# Patient Record
Sex: MALE | Race: WHITE | NOT HISPANIC OR LATINO | Employment: FULL TIME | URBAN - METROPOLITAN AREA
[De-identification: names, ages, dates, MRNs, and addresses within clinical notes are randomized per-mention and may not be internally consistent; named-entity substitution may affect disease eponyms.]

---

## 2017-08-31 ENCOUNTER — OFFICE VISIT (OUTPATIENT)
Dept: SURGERY | Facility: CLINIC | Age: 27
End: 2017-08-31

## 2017-08-31 VITALS — HEART RATE: 94 BPM | WEIGHT: 165.4 LBS | HEIGHT: 66 IN | OXYGEN SATURATION: 98 % | BODY MASS INDEX: 26.58 KG/M2

## 2017-08-31 DIAGNOSIS — L05.01 PILONIDAL ABSCESS: Primary | ICD-10-CM

## 2017-08-31 PROCEDURE — 87205 SMEAR GRAM STAIN: CPT | Performed by: SURGERY

## 2017-08-31 PROCEDURE — 10080 I&D PILONIDAL CYST SIMPLE: CPT | Performed by: SURGERY

## 2017-08-31 PROCEDURE — 87186 SC STD MICRODIL/AGAR DIL: CPT | Performed by: SURGERY

## 2017-08-31 PROCEDURE — 87070 CULTURE OTHR SPECIMN AEROBIC: CPT | Performed by: SURGERY

## 2017-09-02 NOTE — PROGRESS NOTES
PREOPERATIVE DIAGNOSIS:  Pilonidal abscess    POSTOPERATIVE DIAGNOSIS AND FINDINGS:  Same    PROCEDURE:  Incision and drainage of pilonidal abscess    SURGEON:  Joe Weber MD    ASSISTANT:  None    ANESTHESIA:  1% lidocaine with epinephrine    EBL:  Minimal    SPECIMEN(S):  Culture and sensitivity of pus    DESCRIPTION:  27-year-old gentleman presented with pilonidal abscess.  In prone position prepped and draped usual sterile manner.  1% lidocaine with epinephrine for an infiltrated locally.  Transverse incision was made over the pilonidal abscess which was located at the superior aspect of the intergluteal crease towards the right.  Several cc of purulent fluid and hair were removed from the cavity.  The wound was packed with iodoform gauze.  Wound care instructions given.  Follow-up with me in 1 week.    Joe Weber M.D.

## 2017-09-03 LAB
BACTERIA SPEC AEROBE CULT: ABNORMAL
GRAM STN SPEC: ABNORMAL
GRAM STN SPEC: ABNORMAL

## 2017-09-07 ENCOUNTER — OFFICE VISIT (OUTPATIENT)
Dept: SURGERY | Facility: CLINIC | Age: 27
End: 2017-09-07

## 2017-09-07 DIAGNOSIS — Z09 FOLLOW UP: Primary | ICD-10-CM

## 2017-09-07 PROCEDURE — 99024 POSTOP FOLLOW-UP VISIT: CPT | Performed by: SURGERY

## 2017-09-07 RX ORDER — CEPHALEXIN 500 MG/1
1 CAPSULE ORAL 3 TIMES DAILY
Refills: 0 | COMMUNITY
Start: 2017-08-31 | End: 2019-09-05

## 2017-09-07 NOTE — PROGRESS NOTES
His pilonidal wound has healed completely and he is back to normal.  I discussed with him surgical resection versus continued expectant observation.  He's had minimal trouble so far and we'll keep an eye on things for now.

## 2019-09-05 ENCOUNTER — OFFICE VISIT (OUTPATIENT)
Dept: SURGERY | Facility: CLINIC | Age: 29
End: 2019-09-05

## 2019-09-05 VITALS — BODY MASS INDEX: 26.7 KG/M2 | HEIGHT: 66 IN

## 2019-09-05 DIAGNOSIS — L05.01 PILONIDAL ABSCESS: Primary | ICD-10-CM

## 2019-09-05 PROCEDURE — 87205 SMEAR GRAM STAIN: CPT | Performed by: SURGERY

## 2019-09-05 PROCEDURE — 99213 OFFICE O/P EST LOW 20 MIN: CPT | Performed by: SURGERY

## 2019-09-05 PROCEDURE — 87186 SC STD MICRODIL/AGAR DIL: CPT | Performed by: SURGERY

## 2019-09-05 PROCEDURE — 87077 CULTURE AEROBIC IDENTIFY: CPT | Performed by: SURGERY

## 2019-09-05 PROCEDURE — 87070 CULTURE OTHR SPECIMN AEROBIC: CPT | Performed by: SURGERY

## 2019-09-05 RX ORDER — AMOXICILLIN AND CLAVULANATE POTASSIUM 875; 125 MG/1; MG/1
1 TABLET, FILM COATED ORAL 2 TIMES DAILY
Qty: 28 TABLET | Refills: 0 | Status: SHIPPED | OUTPATIENT
Start: 2019-09-05 | End: 2019-09-26

## 2019-09-07 NOTE — PROGRESS NOTES
SUMMARY (A/P):    29-year-old gentleman with recurrent pilonidal sinus disease and probable spontaneously draining abscess.  I placed him on oral Augmentin for 2 weeks and asked him return at that time.  He is going to try to avoid as much sitting as work and see if these 2 measures are adequate.    CC:    Pilonidal sinus    HPI:    29-year-old gentleman whom I initially saw in September 2017 with a pilonidal abscess for which office incision and drainage was performed.  Indicates that he has done well since that time until he switched to a job that requires more sitting in the last week has had progressively increasing pain in the pilonidal region until the area started draining and the pain decreased.    PSH:    None    PMH:    Unremarkable    FAMILY HISTORY:    Father with colon cancer    SOCIAL HISTORY:   Denies tobacco use  Occasional alcohol use    ALLERGIES: reviewed, in Epic    MEDICATIONS: reviewed, in Epic    ROS:  No chest pain or shortness of air.  Negative for fever or chills    PHYSICAL EXAM:   Constitutional: Well-developed well-nourished, no acute distress  Eyes: Conjunctiva normal, sclera nonicteric  ENMT: Hearing grossly normal, oral mucosa moist  Neck: Supple  Respiratory: Normal inspiratory effort  Cardiovascular: Regular rate  Gastrointestinal: Soft  Skin:  Warm, dry.  In the pilonidal region he has multiple midline sinuses and right and superior to the gluteal crease is an indurated area with somewhat purulent discharge and somewhat tender, no crepitance, no erythema, culture was obtained  Musculoskeletal: Symmetric strength, normal gait  Psychiatric: Alert and oriented ×3, normal affect     TAMANNA DAVIDSON M.D.

## 2019-09-10 LAB
BACTERIA SPEC AEROBE CULT: ABNORMAL
GRAM STN SPEC: ABNORMAL
GRAM STN SPEC: ABNORMAL

## 2019-09-26 ENCOUNTER — OFFICE VISIT (OUTPATIENT)
Dept: SURGERY | Facility: CLINIC | Age: 29
End: 2019-09-26

## 2019-09-26 VITALS — OXYGEN SATURATION: 99 % | HEART RATE: 66 BPM

## 2019-09-26 DIAGNOSIS — L05.01 PILONIDAL CYST WITH ABSCESS: Primary | ICD-10-CM

## 2019-09-26 PROCEDURE — 99212 OFFICE O/P EST SF 10 MIN: CPT | Performed by: SURGERY

## 2019-09-26 NOTE — PROGRESS NOTES
IMPRESSION & PLAN:  Resolved pilonidal abscess with antibiotics. No further treatment warranted at this time.  Follow-up as needed.    CC: Follow-up pilonidal cyst with abscess    HPI: With antibiotics and avoidance of sitting his pilonidal region feels much better.    PE:    Awake, alert  The abscess has completely resolved and he has several pilonidal sinus openings but no evidence of ongoing infection

## 2020-02-28 RX ORDER — AMOXICILLIN AND CLAVULANATE POTASSIUM 875; 125 MG/1; MG/1
TABLET, FILM COATED ORAL
Qty: 28 TABLET | Refills: 0 | OUTPATIENT
Start: 2020-02-28

## 2020-03-03 ENCOUNTER — TELEPHONE (OUTPATIENT)
Dept: SURGERY | Facility: CLINIC | Age: 30
End: 2020-03-03

## 2020-03-03 NOTE — TELEPHONE ENCOUNTER
Called in Augmentin 875-125 mg 1 tab po BID, #20, 1 refill to [facility] on [address]. Informed patient of this as well.

## 2020-03-03 NOTE — TELEPHONE ENCOUNTER
Patient called the office and LVM on clinical line stating he was under the impression from the last time he was seen on 9/26/19 he could just simply request a refill of abx if he had another flare up of his pilonidal cyst. Patient is asking why his refill request for Augmentin from this past Friday 2/28/20 was refused. You were not sent the Rx. Sunil denied it and routed back to the pharmacy. Are you okay with filling this or do you want to see him in the office?

## 2020-04-07 ENCOUNTER — TELEPHONE (OUTPATIENT)
Dept: SURGERY | Facility: CLINIC | Age: 30
End: 2020-04-07

## 2020-04-07 NOTE — TELEPHONE ENCOUNTER
Pt cld stating that his Pilonidal Cyst is flaring up bad again.  He has been taking the antibiotic with no relief. He is on your schedule for 04/30/20, but on the list to resched.    Pt is requesting he be seen - he would prefer sooner than later due to the pain & feels this needs to be drained.    Reschedule him sooner?

## 2020-04-09 ENCOUNTER — TELEPHONE (OUTPATIENT)
Dept: SURGERY | Facility: CLINIC | Age: 30
End: 2020-04-09

## 2020-04-09 ENCOUNTER — OFFICE VISIT (OUTPATIENT)
Dept: SURGERY | Facility: CLINIC | Age: 30
End: 2020-04-09

## 2020-04-09 VITALS — WEIGHT: 163 LBS | HEIGHT: 66 IN | BODY MASS INDEX: 26.2 KG/M2

## 2020-04-09 DIAGNOSIS — L05.01 PILONIDAL CYST WITH ABSCESS: Primary | ICD-10-CM

## 2020-04-09 PROCEDURE — 87077 CULTURE AEROBIC IDENTIFY: CPT | Performed by: SURGERY

## 2020-04-09 PROCEDURE — 87186 SC STD MICRODIL/AGAR DIL: CPT | Performed by: SURGERY

## 2020-04-09 PROCEDURE — 87205 SMEAR GRAM STAIN: CPT | Performed by: SURGERY

## 2020-04-09 PROCEDURE — 87070 CULTURE OTHR SPECIMN AEROBIC: CPT | Performed by: SURGERY

## 2020-04-09 PROCEDURE — 10080 I&D PILONIDAL CYST SIMPLE: CPT | Performed by: SURGERY

## 2020-04-09 RX ORDER — AMOXICILLIN AND CLAVULANATE POTASSIUM 875; 125 MG/1; MG/1
1 TABLET, FILM COATED ORAL 2 TIMES DAILY
COMMUNITY
Start: 2020-03-24 | End: 2020-04-09

## 2020-04-09 NOTE — PROGRESS NOTES
PREOPERATIVE DIAGNOSIS:  Pilonidal abscess    POSTOPERATIVE DIAGNOSIS (FINDINGS):  Same    PROCEDURE:  Incision and drainage of pilonidal abscess    SURGEON:  Joe Weber MD    ANESTHESIA:  1% lidocaine with epinephrine local    EBL:  Minimal    SPECIMEN(S):  Wound culture    DESCRIPTION:  In prone position prepped and draped usual sterile manner.  1% lidocaine with epinephrine infiltrated locally and a 1.5 cm circular incision made around a tender fluctuant indurated abscess located just right of the superior aspect of the intergluteal crease.  Several milliliters of purulent fluid expressed and cultured.  Once I was sure the pus was completely evacuated and good hemostasis was noted, I packed the wound with Betadine soaked gauze.  Wound care instructions given.  Follow-up in several weeks.    Joe Weber M.D.

## 2020-04-09 NOTE — TELEPHONE ENCOUNTER
Patient's Rx did not e-scribe today. Per Dr. Weber, give patient 1 additional week of abx.   Called in the following to [facility] on [address]:  Augmentin 875-125 mg, take 1 tab po BID, #14, NR.

## 2020-04-12 LAB
BACTERIA SPEC AEROBE CULT: ABNORMAL
GRAM STN SPEC: ABNORMAL
GRAM STN SPEC: ABNORMAL

## 2020-07-15 ENCOUNTER — TELEPHONE (OUTPATIENT)
Dept: SURGERY | Facility: CLINIC | Age: 30
End: 2020-07-15

## 2020-07-15 NOTE — TELEPHONE ENCOUNTER
PT is requesting an antibiotic for the swelling he is not able to get into the office until august

## 2020-08-13 ENCOUNTER — OFFICE VISIT (OUTPATIENT)
Dept: SURGERY | Facility: CLINIC | Age: 30
End: 2020-08-13

## 2020-08-13 VITALS — BODY MASS INDEX: 25.33 KG/M2 | WEIGHT: 157.6 LBS | HEIGHT: 66 IN

## 2020-08-13 DIAGNOSIS — L05.01 PILONIDAL CYST WITH ABSCESS: Primary | ICD-10-CM

## 2020-08-13 PROCEDURE — 99213 OFFICE O/P EST LOW 20 MIN: CPT | Performed by: SURGERY

## 2020-08-13 RX ORDER — CEFAZOLIN SODIUM 2 G/100ML
2 INJECTION, SOLUTION INTRAVENOUS ONCE
Status: CANCELLED | OUTPATIENT
Start: 2020-09-04 | End: 2020-08-13

## 2020-08-13 NOTE — PROGRESS NOTES
SUMMARY (A/P):    29-year-old gentleman with chronic recurrent pilonidal cyst disease and abscesses.  He wishes to proceed with elliptical excision of this area as we discussed previously.  He has done extensive research on his own and has seen pictures of the type and size of wounds which can result and is well aware that this can take more than 2 months to heal completely.  He understands that no guarantee can be made that this will prevent further recurrences down the road.  He understands that sometimes additional surgery is necessary to achieve complete closure of the wound.  All questions were answered and he is scheduled accordingly.      CC:    Recurrent pilonidal cyst disease    HPI:    29-year-old gentleman whom I have been seeing since August 2017 for issues with pilonidal cysts.  He is undergone 2 office procedures for drainage of abscesses and now presents with increased pain in the pilonidal worse with direct palpation and associated with drainage.    PSH:    • Incision and drainage of pilonidal abscess 4/9/2020  • Incision and drainage of pilonidal abscess 8/31/2017    PMH:    • Unremarkable    FAMILY HISTORY:    • Father with colon cancer    SOCIAL HISTORY:   • Denies tobacco use  • Occasional alcohol use    ALLERGIES:   • None    MEDICATIONS:   • None    ROS:    Influenza Like Illness: no fever, no  cough, no  sore throat, no  body aches, no loss of sense of taste or smell, no known exposure to person with Covid-19.  All other systems reviewed and negative other than presenting complaints.    PHYSICAL EXAM:   • Constitutional: Well-developed well-nourished, no acute distress  • Vital signs: Weight 157 pounds, height 66 inches, BMI 25.4  • Eyes: Conjunctiva normal, sclera nonicteric  • ENMT: Hearing grossly normal, oral mucosa moist  • Respiratory: Normal inspiratory effort  • Cardiovascular: Regular rate, no jugular venous distention  • Gastrointestinal: Soft  • Skin:  Warm, dry.  In the pilonidal  region he has multiple pilonidal sinuses.  Right superior inter-gluteal crease scarred prior abscess with no active drainage or infection.  Left superior inter-gluteal crease is a tender somewhat indurated region consistent with pilonidal cyst/abscess  • Musculoskeletal: Symmetric strength, normal gait  • Psychiatric: Alert and oriented ×3, normal affect     TAMANNA DAVIDSON M.D.

## 2020-08-25 ENCOUNTER — TRANSCRIBE ORDERS (OUTPATIENT)
Dept: PREADMISSION TESTING | Facility: HOSPITAL | Age: 30
End: 2020-08-25

## 2020-08-25 DIAGNOSIS — Z01.818 OTHER SPECIFIED PRE-OPERATIVE EXAMINATION: Primary | ICD-10-CM

## 2020-08-28 ENCOUNTER — APPOINTMENT (OUTPATIENT)
Dept: PREADMISSION TESTING | Facility: HOSPITAL | Age: 30
End: 2020-08-28

## 2020-08-28 VITALS
TEMPERATURE: 97.6 F | HEIGHT: 66 IN | BODY MASS INDEX: 25.23 KG/M2 | OXYGEN SATURATION: 100 % | SYSTOLIC BLOOD PRESSURE: 134 MMHG | WEIGHT: 157 LBS | DIASTOLIC BLOOD PRESSURE: 85 MMHG | HEART RATE: 62 BPM | RESPIRATION RATE: 16 BRPM

## 2020-08-28 LAB
DEPRECATED RDW RBC AUTO: 43.9 FL (ref 37–54)
ERYTHROCYTE [DISTWIDTH] IN BLOOD BY AUTOMATED COUNT: 13.9 % (ref 12.3–15.4)
HCT VFR BLD AUTO: 47.9 % (ref 37.5–51)
HGB BLD-MCNC: 16.3 G/DL (ref 13–17.7)
MCH RBC QN AUTO: 29.6 PG (ref 26.6–33)
MCHC RBC AUTO-ENTMCNC: 34 G/DL (ref 31.5–35.7)
MCV RBC AUTO: 87.1 FL (ref 79–97)
PLATELET # BLD AUTO: 246 10*3/MM3 (ref 140–450)
PMV BLD AUTO: 11.8 FL (ref 6–12)
RBC # BLD AUTO: 5.5 10*6/MM3 (ref 4.14–5.8)
WBC # BLD AUTO: 6.44 10*3/MM3 (ref 3.4–10.8)

## 2020-08-28 PROCEDURE — 85027 COMPLETE CBC AUTOMATED: CPT | Performed by: SURGERY

## 2020-08-28 PROCEDURE — 36415 COLL VENOUS BLD VENIPUNCTURE: CPT

## 2020-09-02 ENCOUNTER — LAB (OUTPATIENT)
Dept: LAB | Facility: HOSPITAL | Age: 30
End: 2020-09-02

## 2020-09-02 DIAGNOSIS — Z01.818 OTHER SPECIFIED PRE-OPERATIVE EXAMINATION: ICD-10-CM

## 2020-09-02 PROCEDURE — U0004 COV-19 TEST NON-CDC HGH THRU: HCPCS

## 2020-09-02 PROCEDURE — C9803 HOPD COVID-19 SPEC COLLECT: HCPCS

## 2020-09-03 LAB — SARS-COV-2 RNA RESP QL NAA+PROBE: NOT DETECTED

## 2020-09-04 ENCOUNTER — ANESTHESIA EVENT (OUTPATIENT)
Dept: PERIOP | Facility: HOSPITAL | Age: 30
End: 2020-09-04

## 2020-09-04 ENCOUNTER — HOSPITAL ENCOUNTER (OUTPATIENT)
Facility: HOSPITAL | Age: 30
Setting detail: HOSPITAL OUTPATIENT SURGERY
Discharge: HOME | End: 2020-09-04
Attending: SURGERY | Admitting: SURGERY

## 2020-09-04 ENCOUNTER — TELEPHONE (OUTPATIENT)
Dept: SURGERY | Facility: CLINIC | Age: 30
End: 2020-09-04

## 2020-09-04 ENCOUNTER — ANESTHESIA (OUTPATIENT)
Dept: PERIOP | Facility: HOSPITAL | Age: 30
End: 2020-09-04

## 2020-09-04 VITALS
RESPIRATION RATE: 16 BRPM | DIASTOLIC BLOOD PRESSURE: 87 MMHG | HEART RATE: 74 BPM | SYSTOLIC BLOOD PRESSURE: 123 MMHG | OXYGEN SATURATION: 99 % | TEMPERATURE: 97.8 F

## 2020-09-04 DIAGNOSIS — L05.01 PILONIDAL CYST WITH ABSCESS: ICD-10-CM

## 2020-09-04 PROCEDURE — 11772 EXC PILONIDAL CYST COMP: CPT | Performed by: SURGERY

## 2020-09-04 PROCEDURE — 25010000002 MIDAZOLAM PER 1 MG: Performed by: ANESTHESIOLOGY

## 2020-09-04 PROCEDURE — 25010000002 NEOSTIGMINE PER 0.5 MG: Performed by: NURSE ANESTHETIST, CERTIFIED REGISTERED

## 2020-09-04 PROCEDURE — C9290 INJ, BUPIVACAINE LIPOSOME: HCPCS | Performed by: SURGERY

## 2020-09-04 PROCEDURE — 87186 SC STD MICRODIL/AGAR DIL: CPT | Performed by: SURGERY

## 2020-09-04 PROCEDURE — 87070 CULTURE OTHR SPECIMN AEROBIC: CPT | Performed by: SURGERY

## 2020-09-04 PROCEDURE — 25010000003 CEFAZOLIN IN DEXTROSE 2-4 GM/100ML-% SOLUTION: Performed by: SURGERY

## 2020-09-04 PROCEDURE — 25010000003 BUPIVACAINE LIPOSOME 1.3 % SUSPENSION: Performed by: SURGERY

## 2020-09-04 PROCEDURE — 25010000002 FENTANYL CITRATE (PF) 100 MCG/2ML SOLUTION: Performed by: ANESTHESIOLOGY

## 2020-09-04 PROCEDURE — 88304 TISSUE EXAM BY PATHOLOGIST: CPT | Performed by: SURGERY

## 2020-09-04 PROCEDURE — 25010000002 HYDROMORPHONE PER 4 MG: Performed by: NURSE ANESTHETIST, CERTIFIED REGISTERED

## 2020-09-04 PROCEDURE — 87205 SMEAR GRAM STAIN: CPT | Performed by: SURGERY

## 2020-09-04 PROCEDURE — 25010000002 KETOROLAC TROMETHAMINE PER 15 MG: Performed by: NURSE ANESTHETIST, CERTIFIED REGISTERED

## 2020-09-04 PROCEDURE — 25010000002 DEXAMETHASONE PER 1 MG: Performed by: NURSE ANESTHETIST, CERTIFIED REGISTERED

## 2020-09-04 PROCEDURE — 25010000002 SUCCINYLCHOLINE PER 20 MG: Performed by: NURSE ANESTHETIST, CERTIFIED REGISTERED

## 2020-09-04 PROCEDURE — 25010000002 PROPOFOL 10 MG/ML EMULSION: Performed by: NURSE ANESTHETIST, CERTIFIED REGISTERED

## 2020-09-04 RX ORDER — FLUMAZENIL 0.1 MG/ML
0.2 INJECTION INTRAVENOUS AS NEEDED
Status: DISCONTINUED | OUTPATIENT
Start: 2020-09-04 | End: 2020-09-04 | Stop reason: HOSPADM

## 2020-09-04 RX ORDER — SULFAMETHOXAZOLE AND TRIMETHOPRIM 800; 160 MG/1; MG/1
1 TABLET ORAL 2 TIMES DAILY
Qty: 20 TABLET | Refills: 0 | Status: SHIPPED | OUTPATIENT
Start: 2020-09-04 | End: 2020-09-14

## 2020-09-04 RX ORDER — ONDANSETRON 2 MG/ML
4 INJECTION INTRAMUSCULAR; INTRAVENOUS ONCE AS NEEDED
Status: DISCONTINUED | OUTPATIENT
Start: 2020-09-04 | End: 2020-09-04 | Stop reason: HOSPADM

## 2020-09-04 RX ORDER — MIDAZOLAM HYDROCHLORIDE 1 MG/ML
2 INJECTION INTRAMUSCULAR; INTRAVENOUS
Status: DISCONTINUED | OUTPATIENT
Start: 2020-09-04 | End: 2020-09-04 | Stop reason: HOSPADM

## 2020-09-04 RX ORDER — SUCCINYLCHOLINE CHLORIDE 20 MG/ML
INJECTION INTRAMUSCULAR; INTRAVENOUS AS NEEDED
Status: DISCONTINUED | OUTPATIENT
Start: 2020-09-04 | End: 2020-09-04 | Stop reason: SURG

## 2020-09-04 RX ORDER — CEFAZOLIN SODIUM 2 G/100ML
2 INJECTION, SOLUTION INTRAVENOUS ONCE
Status: COMPLETED | OUTPATIENT
Start: 2020-09-04 | End: 2020-09-04

## 2020-09-04 RX ORDER — HYDROCODONE BITARTRATE AND ACETAMINOPHEN 5; 325 MG/1; MG/1
TABLET ORAL
Qty: 50 TABLET | Refills: 0 | Status: SHIPPED | OUTPATIENT
Start: 2020-09-04 | End: 2020-09-24

## 2020-09-04 RX ORDER — GLYCOPYRROLATE 0.2 MG/ML
INJECTION INTRAMUSCULAR; INTRAVENOUS AS NEEDED
Status: DISCONTINUED | OUTPATIENT
Start: 2020-09-04 | End: 2020-09-04 | Stop reason: SURG

## 2020-09-04 RX ORDER — SODIUM CHLORIDE 0.9 % (FLUSH) 0.9 %
3-10 SYRINGE (ML) INJECTION AS NEEDED
Status: DISCONTINUED | OUTPATIENT
Start: 2020-09-04 | End: 2020-09-04 | Stop reason: HOSPADM

## 2020-09-04 RX ORDER — MAGNESIUM HYDROXIDE 1200 MG/15ML
LIQUID ORAL
Qty: 1000 ML | Refills: 0 | Status: SHIPPED | OUTPATIENT
Start: 2020-09-04 | End: 2020-11-12

## 2020-09-04 RX ORDER — FENTANYL CITRATE 50 UG/ML
50 INJECTION, SOLUTION INTRAMUSCULAR; INTRAVENOUS
Status: DISCONTINUED | OUTPATIENT
Start: 2020-09-04 | End: 2020-09-04 | Stop reason: HOSPADM

## 2020-09-04 RX ORDER — FAMOTIDINE 10 MG/ML
20 INJECTION, SOLUTION INTRAVENOUS ONCE
Status: COMPLETED | OUTPATIENT
Start: 2020-09-04 | End: 2020-09-04

## 2020-09-04 RX ORDER — MAGNESIUM HYDROXIDE 1200 MG/15ML
LIQUID ORAL AS NEEDED
Status: DISCONTINUED | OUTPATIENT
Start: 2020-09-04 | End: 2020-09-04 | Stop reason: HOSPADM

## 2020-09-04 RX ORDER — HYDROCODONE BITARTRATE AND ACETAMINOPHEN 7.5; 325 MG/1; MG/1
1 TABLET ORAL ONCE AS NEEDED
Status: DISCONTINUED | OUTPATIENT
Start: 2020-09-04 | End: 2020-09-04 | Stop reason: HOSPADM

## 2020-09-04 RX ORDER — DEXAMETHASONE SODIUM PHOSPHATE 10 MG/ML
INJECTION INTRAMUSCULAR; INTRAVENOUS AS NEEDED
Status: DISCONTINUED | OUTPATIENT
Start: 2020-09-04 | End: 2020-09-04 | Stop reason: SURG

## 2020-09-04 RX ORDER — OXYCODONE AND ACETAMINOPHEN 7.5; 325 MG/1; MG/1
1 TABLET ORAL ONCE AS NEEDED
Status: DISCONTINUED | OUTPATIENT
Start: 2020-09-04 | End: 2020-09-04 | Stop reason: HOSPADM

## 2020-09-04 RX ORDER — ONDANSETRON 4 MG/1
4 TABLET, FILM COATED ORAL EVERY 6 HOURS PRN
Qty: 10 TABLET | Refills: 1 | Status: SHIPPED | OUTPATIENT
Start: 2020-09-04 | End: 2020-09-24

## 2020-09-04 RX ORDER — HYDROMORPHONE HCL 110MG/55ML
PATIENT CONTROLLED ANALGESIA SYRINGE INTRAVENOUS AS NEEDED
Status: DISCONTINUED | OUTPATIENT
Start: 2020-09-04 | End: 2020-09-04 | Stop reason: SURG

## 2020-09-04 RX ORDER — KETOROLAC TROMETHAMINE 30 MG/ML
INJECTION, SOLUTION INTRAMUSCULAR; INTRAVENOUS AS NEEDED
Status: DISCONTINUED | OUTPATIENT
Start: 2020-09-04 | End: 2020-09-04 | Stop reason: SURG

## 2020-09-04 RX ORDER — EPHEDRINE SULFATE 50 MG/ML
5 INJECTION, SOLUTION INTRAVENOUS ONCE AS NEEDED
Status: DISCONTINUED | OUTPATIENT
Start: 2020-09-04 | End: 2020-09-04 | Stop reason: HOSPADM

## 2020-09-04 RX ORDER — ROCURONIUM BROMIDE 10 MG/ML
INJECTION, SOLUTION INTRAVENOUS AS NEEDED
Status: DISCONTINUED | OUTPATIENT
Start: 2020-09-04 | End: 2020-09-04 | Stop reason: SURG

## 2020-09-04 RX ORDER — LIDOCAINE HYDROCHLORIDE 10 MG/ML
0.5 INJECTION, SOLUTION EPIDURAL; INFILTRATION; INTRACAUDAL; PERINEURAL ONCE AS NEEDED
Status: COMPLETED | OUTPATIENT
Start: 2020-09-04 | End: 2020-09-04

## 2020-09-04 RX ORDER — SODIUM CHLORIDE, SODIUM LACTATE, POTASSIUM CHLORIDE, CALCIUM CHLORIDE 600; 310; 30; 20 MG/100ML; MG/100ML; MG/100ML; MG/100ML
9 INJECTION, SOLUTION INTRAVENOUS CONTINUOUS
Status: DISCONTINUED | OUTPATIENT
Start: 2020-09-04 | End: 2020-09-04 | Stop reason: HOSPADM

## 2020-09-04 RX ORDER — LIDOCAINE HYDROCHLORIDE 20 MG/ML
INJECTION, SOLUTION INFILTRATION; PERINEURAL AS NEEDED
Status: DISCONTINUED | OUTPATIENT
Start: 2020-09-04 | End: 2020-09-04 | Stop reason: SURG

## 2020-09-04 RX ORDER — HYDROMORPHONE HYDROCHLORIDE 1 MG/ML
0.5 INJECTION, SOLUTION INTRAMUSCULAR; INTRAVENOUS; SUBCUTANEOUS
Status: DISCONTINUED | OUTPATIENT
Start: 2020-09-04 | End: 2020-09-04 | Stop reason: HOSPADM

## 2020-09-04 RX ORDER — FENTANYL CITRATE 50 UG/ML
100 INJECTION, SOLUTION INTRAMUSCULAR; INTRAVENOUS
Status: DISCONTINUED | OUTPATIENT
Start: 2020-09-04 | End: 2020-09-04 | Stop reason: HOSPADM

## 2020-09-04 RX ORDER — SODIUM CHLORIDE 0.9 % (FLUSH) 0.9 %
3 SYRINGE (ML) INJECTION EVERY 12 HOURS SCHEDULED
Status: DISCONTINUED | OUTPATIENT
Start: 2020-09-04 | End: 2020-09-04 | Stop reason: HOSPADM

## 2020-09-04 RX ORDER — PROPOFOL 10 MG/ML
VIAL (ML) INTRAVENOUS AS NEEDED
Status: DISCONTINUED | OUTPATIENT
Start: 2020-09-04 | End: 2020-09-04 | Stop reason: SURG

## 2020-09-04 RX ORDER — DEXMEDETOMIDINE HYDROCHLORIDE 100 UG/ML
INJECTION, SOLUTION INTRAVENOUS AS NEEDED
Status: DISCONTINUED | OUTPATIENT
Start: 2020-09-04 | End: 2020-09-04 | Stop reason: SURG

## 2020-09-04 RX ORDER — BUPIVACAINE HYDROCHLORIDE AND EPINEPHRINE 5; 5 MG/ML; UG/ML
INJECTION, SOLUTION EPIDURAL; INTRACAUDAL; PERINEURAL AS NEEDED
Status: DISCONTINUED | OUTPATIENT
Start: 2020-09-04 | End: 2020-09-04 | Stop reason: HOSPADM

## 2020-09-04 RX ORDER — HYDRALAZINE HYDROCHLORIDE 20 MG/ML
5 INJECTION INTRAMUSCULAR; INTRAVENOUS
Status: DISCONTINUED | OUTPATIENT
Start: 2020-09-04 | End: 2020-09-04 | Stop reason: HOSPADM

## 2020-09-04 RX ADMIN — PROPOFOL 200 MG: 10 INJECTION, EMULSION INTRAVENOUS at 07:18

## 2020-09-04 RX ADMIN — SUCCINYLCHOLINE CHLORIDE 80 MG: 20 INJECTION, SOLUTION INTRAMUSCULAR; INTRAVENOUS at 07:18

## 2020-09-04 RX ADMIN — LIDOCAINE HYDROCHLORIDE 0.5 ML: 10 INJECTION, SOLUTION EPIDURAL; INFILTRATION; INTRACAUDAL; PERINEURAL at 06:12

## 2020-09-04 RX ADMIN — ROCURONIUM BROMIDE 10 MG: 10 INJECTION, SOLUTION INTRAVENOUS at 07:32

## 2020-09-04 RX ADMIN — SODIUM CHLORIDE, POTASSIUM CHLORIDE, SODIUM LACTATE AND CALCIUM CHLORIDE 9 ML/HR: 600; 310; 30; 20 INJECTION, SOLUTION INTRAVENOUS at 06:11

## 2020-09-04 RX ADMIN — MIDAZOLAM 2 MG: 1 INJECTION INTRAMUSCULAR; INTRAVENOUS at 06:11

## 2020-09-04 RX ADMIN — FAMOTIDINE 20 MG: 10 INJECTION INTRAVENOUS at 06:12

## 2020-09-04 RX ADMIN — CEFAZOLIN SODIUM 2 G: 2 INJECTION, SOLUTION INTRAVENOUS at 07:10

## 2020-09-04 RX ADMIN — DEXMEDETOMIDINE 10 MCG: 100 INJECTION, SOLUTION, CONCENTRATE INTRAVENOUS at 07:36

## 2020-09-04 RX ADMIN — FENTANYL CITRATE 50 MCG: 50 INJECTION INTRAMUSCULAR; INTRAVENOUS at 07:18

## 2020-09-04 RX ADMIN — KETOROLAC TROMETHAMINE 30 MG: 30 INJECTION, SOLUTION INTRAMUSCULAR; INTRAVENOUS at 07:49

## 2020-09-04 RX ADMIN — LIDOCAINE HYDROCHLORIDE 100 MG: 20 INJECTION, SOLUTION INFILTRATION; PERINEURAL at 07:18

## 2020-09-04 RX ADMIN — Medication 2 MG: at 08:01

## 2020-09-04 RX ADMIN — CEFAZOLIN SODIUM 2 G: 2 INJECTION, SOLUTION INTRAVENOUS at 07:26

## 2020-09-04 RX ADMIN — HYDROMORPHONE HYDROCHLORIDE 0.5 MG: 2 INJECTION, SOLUTION INTRAMUSCULAR; INTRAVENOUS; SUBCUTANEOUS at 07:49

## 2020-09-04 RX ADMIN — DEXAMETHASONE SODIUM PHOSPHATE 8 MG: 10 INJECTION INTRAMUSCULAR; INTRAVENOUS at 07:26

## 2020-09-04 RX ADMIN — FENTANYL CITRATE 50 MCG: 50 INJECTION INTRAMUSCULAR; INTRAVENOUS at 07:30

## 2020-09-04 RX ADMIN — GLYCOPYRROLATE 0.1 MG: 0.2 INJECTION INTRAMUSCULAR; INTRAVENOUS at 08:01

## 2020-09-04 RX ADMIN — ROCURONIUM BROMIDE 10 MG: 10 INJECTION, SOLUTION INTRAVENOUS at 07:18

## 2020-09-04 NOTE — TELEPHONE ENCOUNTER
Pt's significant other called for wound care instructions s/p pilonidal cystectomy today. The discharge instructions they received were post-op instructions for a hemorrhoidectomy and did not include information regarding wound care. Wet-to-dry dressings? BID? Please advise.

## 2020-09-04 NOTE — ANESTHESIA PROCEDURE NOTES
Airway  Urgency: elective    Date/Time: 9/4/2020 7:19 AM  Airway not difficult    General Information and Staff    Patient location during procedure: OR  CRNA: Roberto Kasper CRNA    Indications and Patient Condition  Indications for airway management: airway protection    Preoxygenated: yes  MILS maintained throughout  Mask difficulty assessment: 1 - vent by mask    Final Airway Details  Final airway type: endotracheal airway      Successful airway: ETT  Cuffed: yes   Successful intubation technique: direct laryngoscopy  Endotracheal tube insertion site: oral  Blade: Luke  Blade size: 3  ETT size (mm): 7.5  Cormack-Lehane Classification: grade I - full view of glottis  Placement verified by: chest auscultation   Measured from: teeth  ETT/EBT  to teeth (cm): 22  Number of attempts at approach: 1  Assessment: lips, teeth, and gum same as pre-op and atraumatic intubation

## 2020-09-04 NOTE — ANESTHESIA PREPROCEDURE EVALUATION
Anesthesia Evaluation     Patient summary reviewed and Nursing notes reviewed   NPO Solid Status: > 8 hours             Airway   Mallampati: II  TM distance: >3 FB  Neck ROM: full  no difficulty expected  Dental - normal exam     Pulmonary - normal exam   (+) a smoker Current,   Cardiovascular - negative cardio ROS and normal exam        Neuro/Psych- negative ROS  GI/Hepatic/Renal/Endo - negative ROS     Musculoskeletal (-) negative ROS    Abdominal  - normal exam   Substance History - negative use     OB/GYN negative ob/gyn ROS         Other                        Anesthesia Plan    ASA 2     general     intravenous induction     Anesthetic plan, all risks, benefits, and alternatives have been provided, discussed and informed consent has been obtained with: patient.    Plan discussed with CRNA.

## 2020-09-04 NOTE — OP NOTE
PREOPERATIVE DIAGNOSIS:  Extensive pilonidal cyst disease and abscess    POSTOPERATIVE DIAGNOSIS (FINDINGS):  Same    PROCEDURE:  Complex excision of pilonidal cyst disease    SURGEON:  Joe Weber MD    ASSISTANT:  Linda Sales    ANESTHESIA:  General    EBL:  Minimal    SPECIMEN(S):  Pilonidal cyst    DESCRIPTION:  In prone jackknife position under general anesthetic prepped and draped usual sterile manner.  Elliptical incision made around the pilonidal sinus openings in the midline and carried to the pre-sacral fascia and the sinuses and underlying cyst were completely excised.  At the superior left and right lateral aspect of the superior extent of pilonidal sinus disease, transverse incision was made over each of the cutaneous abscesses.  These extended to the midline wound and were debrided with uterine curettes.  Good hemostasis was achieved with electrocautery.  Half percent Marcaine with epinephrine mixed with Exparel was infiltrated locally.  Wound was packed with Betadine soaked gauze.  Tolerated well.    Joe Weber M.D.

## 2020-09-04 NOTE — DISCHARGE INSTRUCTIONS
Dr. Joe Weber  [address]  [place] [id number] [phone]    Discharge Instructions for Hemorrhoidectomy/Anal Fissures    1. Go home, rest and take it easy today; however, you should get up and move about several times today to reduce the risk of developing a clot in your legs.      2. You may experience some dizziness or memory loss from the anesthesia.  This may last for the next 24 hours.  Someone should plan on staying with you for the first 24 hours for your safety.    3. Do not make any important legal decisions or sign any legal papers for the next 24 hours.      4. Eat and drink lightly today.  Start off with liquids, jello, soup, crackers or other bland foods at first. Please drink plenty of fluids. You may advance your diet tomorrow as tolerated as long as you do not experience any nausea or vomiting.     5. You should use ice to the anal area today and begin your sitz baths tomorrow.    6. The best method of pain relief is a sitz bath (sitting in a tub or warm water) at least 3 times daily for 15 minutes.  This helps to reduce pain and aids with hygiene/drainage.  The drainage may have an unpleasant odor.  This is not unexpected and should be controlled with baths and showers.  If the skin around the anal area becomes irritated, you may apply Vaseline, A&D ointment or a similar barrier cream to the area.       7. Bleeding and drainage are to be expected and may persist for as long as 2-4 weeks. Bleeding may occur with your bowel movements as well. Wear a cotton liner such as a Kotex pad or panty liner inside your underwear to protect your clothing.      8. Use some type of flushable, moistened wipe instead of bathroom tissue to keep the anal area clean after your bowel movements. It may be necessary to cleanse yourself in the bathtub or shower following your bowel movements.    9. You have received a prescription for a narcotic pain medicine, as you will have pain following  surgery.   You will not be totally pain free, but your pain medicine should make the pain tolerable.  Please take your pain medicine as prescribed and always take your pills with food to prevent nausea. Your pain may persist for 1-3 weeks. If you are having severe pain that cannot be controlled by the pain medicine, please contact me.  Typically, patients with anal fissures will have less pain than those with hemorrhoids.      10. The goal is for your bowel movements to be soft which will help to minimize pain.  The pain medicine used to keep your comfortable may also cause some constipation so I recommend the following:    • Colace stool softener 100mg by mouth 2 times daily  • Citrucel powder 1 tablespoon in 8oz of water daily  • Miralax daily if Colace and Citrucel do not results in soft bowel movements.      Contact me if the above does not result in soft bowel movements as you may need to add to the regimen.      11. You have also received a prescription for an anti-nausea medicine.  Please take this as prescribed for any nausea or vomiting.  Nausea could be a result of the anesthesia or a result of the narcotic pain medicine.  If you experience severe nausea and vomiting that cannot be controlled by the nausea medicine, please call me.      12. No driving for 24 hours and for as long as you are taking your prescription pain medicine.  You may resume your activities gradually.       13. You will need to call the office at [phone] to schedule a follow-up appointment in 6-10 days.    14. Remember to contact me for any of the following:    • Fever > 101 degrees  • Severe pain that cannot be controlled by taking your pain pills  • Severe nausea or vomiting that cannot be controlled by taking your nausea pills  • Significant bleeding   • Any other questions or concerns

## 2020-09-04 NOTE — ANESTHESIA POSTPROCEDURE EVALUATION
Patient: Mj Olsen    Procedure Summary     Date:  09/04/20 Room / Location:   [facility]  [facility]    Anesthesia Start:  0714 Anesthesia Stop:  0810    Procedure:  PILONIDAL CYSTECTOMY (N/A Sacrum) Diagnosis:       Pilonidal cyst with abscess      (Pilonidal cyst with abscess [L05.01])    Surgeon:  oJe Weber MD Provider:  Antoni Alvarez MD    Anesthesia Type:  general ASA Status:  2          Anesthesia Type: general    Vitals  Vitals Value Taken Time   /88 9/4/2020  8:30 AM   Temp 36.6 °C (97.8 °F) 9/4/2020  8:30 AM   Pulse 75 9/4/2020  8:34 AM   Resp 16 9/4/2020  8:30 AM   SpO2 96 % 9/4/2020  8:35 AM   Vitals shown include unvalidated device data.        Post Anesthesia Care and Evaluation    Patient location during evaluation: bedside  Patient participation: complete - patient participated  Level of consciousness: awake  Pain score: 1  Pain management: adequate  Airway patency: patent  Anesthetic complications: No anesthetic complications  PONV Status: controlled  Cardiovascular status: acceptable  Respiratory status: acceptable  Hydration status: acceptable    Comments: --------------------            09/04/20               0847     --------------------   BP:       123/87     Pulse:      74       Resp:       16       Temp:                SpO2:      99%      --------------------

## 2020-09-04 NOTE — TELEPHONE ENCOUNTER
Tell them sorry.  I specifically put instructions in epic which apparently were not shared.  All they need to do is remove the packing that was placed at surgery tomorrow and start wet-to-dry normal saline dressing changes twice a day.  They were supposed to get some 4 x 4 gauze and a liter of normal saline and I suspect that did not happen either.  If that is the case, please call in prescription for normal saline and 4 x 4 gauze.  He may shower over the open wound anytime starting tomorrow.  Thank you.

## 2020-09-08 LAB
LAB AP CASE REPORT: NORMAL
PATH REPORT.FINAL DX SPEC: NORMAL
PATH REPORT.GROSS SPEC: NORMAL

## 2020-09-09 LAB
BACTERIA SPEC AEROBE CULT: ABNORMAL
GRAM STN SPEC: ABNORMAL
GRAM STN SPEC: ABNORMAL

## 2020-09-10 ENCOUNTER — OFFICE VISIT (OUTPATIENT)
Dept: SURGERY | Facility: CLINIC | Age: 30
End: 2020-09-10

## 2020-09-10 DIAGNOSIS — Z09 FOLLOW UP: Primary | ICD-10-CM

## 2020-09-10 PROCEDURE — 99024 POSTOP FOLLOW-UP VISIT: CPT | Performed by: SURGERY

## 2020-09-10 NOTE — PROGRESS NOTES
Complex excision pilonidal cyst disease 9/4/2020    Office visit: Wound is in good order with no evidence of infection.  Instructed wet-to-dry dressing changes twice daily.  Return to office in 2 weeks.

## 2020-09-24 ENCOUNTER — OFFICE VISIT (OUTPATIENT)
Dept: SURGERY | Facility: CLINIC | Age: 30
End: 2020-09-24

## 2020-09-24 DIAGNOSIS — Z09 FOLLOW UP: Primary | ICD-10-CM

## 2020-09-24 PROCEDURE — 99024 POSTOP FOLLOW-UP VISIT: CPT | Performed by: SURGERY

## 2020-09-24 NOTE — PROGRESS NOTES
Wounds over former abscesses are healing well.  Main midline wound is also granulating nicely and magalie.  Treated with silver nitrate.  Return in 2 weeks.

## 2020-10-05 ENCOUNTER — TELEPHONE (OUTPATIENT)
Dept: SURGERY | Facility: CLINIC | Age: 30
End: 2020-10-05

## 2020-10-05 NOTE — TELEPHONE ENCOUNTER
Patient called requesting saline refill for wet-to-dry dressing changes. Called new prescription into pharmacy, left message informing pt.

## 2020-10-08 ENCOUNTER — OFFICE VISIT (OUTPATIENT)
Dept: SURGERY | Facility: CLINIC | Age: 30
End: 2020-10-08

## 2020-10-08 DIAGNOSIS — Z09 FOLLOW UP: Primary | ICD-10-CM

## 2020-10-08 PROCEDURE — 99024 POSTOP FOLLOW-UP VISIT: CPT | Performed by: SURGERY

## 2020-10-10 NOTE — PROGRESS NOTES
Wound is magalie nicely and granulating well at the base.  Treated with silver nitrate.  Return in 3 weeks.

## 2020-10-19 ENCOUNTER — OFFICE VISIT (OUTPATIENT)
Dept: SURGERY | Facility: CLINIC | Age: 30
End: 2020-10-19

## 2020-10-19 DIAGNOSIS — Z09 FOLLOW UP: Primary | ICD-10-CM

## 2020-10-19 PROCEDURE — 99024 POSTOP FOLLOW-UP VISIT: CPT | Performed by: SURGERY

## 2020-10-20 NOTE — PROGRESS NOTES
Wound is almost healed completely.  A tiny area was treated with silver nitrate.  Follow-up in 3 to 4 weeks.

## 2020-11-12 ENCOUNTER — OFFICE VISIT (OUTPATIENT)
Dept: SURGERY | Facility: CLINIC | Age: 30
End: 2020-11-12

## 2020-11-12 DIAGNOSIS — Z09 FOLLOW UP: Primary | ICD-10-CM

## 2020-11-12 PROCEDURE — 99024 POSTOP FOLLOW-UP VISIT: CPT | Performed by: SURGERY

## 2020-11-13 NOTE — PROGRESS NOTES
Everything has healed completely.  Recommended depilatory agent to pilonidal region on a regular basis.  Follow-up as needed.

## 2021-04-16 ENCOUNTER — BULK ORDERING (OUTPATIENT)
Dept: CASE MANAGEMENT | Facility: OTHER | Age: 31
End: 2021-04-16

## 2021-04-16 DIAGNOSIS — Z23 IMMUNIZATION DUE: ICD-10-CM

## 2022-02-07 ENCOUNTER — TELEPHONE (OUTPATIENT)
Dept: SURGERY | Facility: CLINIC | Age: 32
End: 2022-02-07

## 2022-02-07 RX ORDER — SULFAMETHOXAZOLE AND TRIMETHOPRIM 800; 160 MG/1; MG/1
1 TABLET ORAL 2 TIMES DAILY
Qty: 20 TABLET | Refills: 0 | Status: SHIPPED | OUTPATIENT
Start: 2022-02-07 | End: 2022-02-17

## 2022-02-07 NOTE — TELEPHONE ENCOUNTER
I have sent a prescription for Bactrim DS to his [facility] pharmacy for 10 days of treatment. Could someone please call the patient back and let him know the antibiotics have been called in?    Thanks,  VIKY

## 2022-02-07 NOTE — TELEPHONE ENCOUNTER
Caller: Mj Olsen    Relationship: Self    Best call back number: [phone]    What is the best time to reach you: ANYTIME    What was the call regarding: PATIENT'S PILONIDAL CYST HAS COME BACK AND HAS A FOLLOW UP APPT SCHEDULED FOR 02/21/22 WITH DR. DAVIDSON. WANTS TO KNOW IF HE CAN BE PRESCRIBED AN ANTIBIOTIC TO HELP WITH THE SWELLING IN THE MEAN TIME.     PHARMACY:  [facility]   [address] [place] [address]  PH: [phone]    Do you require a callback: YES

## 2022-02-21 ENCOUNTER — OFFICE VISIT (OUTPATIENT)
Dept: SURGERY | Facility: CLINIC | Age: 32
End: 2022-02-21

## 2022-02-21 VITALS — BODY MASS INDEX: 23.7 KG/M2 | WEIGHT: 151 LBS | HEIGHT: 67 IN

## 2022-02-21 DIAGNOSIS — L05.91 PILONIDAL CYST: Primary | ICD-10-CM

## 2022-02-21 PROCEDURE — 99212 OFFICE O/P EST SF 10 MIN: CPT | Performed by: SURGERY

## 2022-02-21 NOTE — PROGRESS NOTES
IMPRESSION & PLAN:  32-year-old gentleman with history of extensive pilonidal cyst disease who underwent complete excision on 9/4/2020.  He was concerned after a protracted sitting episode recently and some redness he saw in the region.  At this time, there is no evidence of recurrence, infection or other problem.    CC: Pilonidal cyst follow-up    HPI:  32-year-old gentleman with history of extensive pilonidal cyst disease who underwent complete excision on 9/4/2020.  He went to a convention that involve protracted sitting as well as a protracted car ride there and back and developed some redness without significant drainage around the site of his wound.    ROS: No fever or chills    PE:    Awake, alert  Skin: In the pilonidal region, he has a well-healed scar in the midline as well as to off midline scars with no open wound, no granulation, no drainage.

## (undated) DEVICE — SPNG GZ WOVN 4X4IN 12PLY 10/BX STRL

## (undated) DEVICE — SUT VIC 3/0 SH 27IN J416H

## (undated) DEVICE — GLV SURG PREMIERPRO ORTHO LTX PF SZ7.5 BRN

## (undated) DEVICE — PK PROC MINOR TOWER 40

## (undated) DEVICE — ELECTRD BLD EDGE/INSUL1P 2.4X5.1MM STRL

## (undated) DEVICE — PANTY KNIT WASHABLE LG/XL BRN/GRN LF

## (undated) DEVICE — SKIN PREP TRAY W/CHG: Brand: MEDLINE INDUSTRIES, INC.